# Patient Record
Sex: FEMALE | NOT HISPANIC OR LATINO
[De-identification: names, ages, dates, MRNs, and addresses within clinical notes are randomized per-mention and may not be internally consistent; named-entity substitution may affect disease eponyms.]

---

## 2018-05-02 PROBLEM — Z00.00 ENCOUNTER FOR PREVENTIVE HEALTH EXAMINATION: Status: ACTIVE | Noted: 2018-05-02

## 2018-05-14 ENCOUNTER — APPOINTMENT (OUTPATIENT)
Dept: BREAST CENTER | Facility: CLINIC | Age: 53
End: 2018-05-14
Payer: MEDICAID

## 2018-05-14 VITALS
DIASTOLIC BLOOD PRESSURE: 74 MMHG | WEIGHT: 142 LBS | SYSTOLIC BLOOD PRESSURE: 111 MMHG | BODY MASS INDEX: 22.82 KG/M2 | TEMPERATURE: 98.1 F | HEART RATE: 55 BPM | HEIGHT: 66 IN

## 2018-05-14 DIAGNOSIS — Z80.7 FAMILY HISTORY OF OTHER MALIGNANT NEOPLASMS OF LYMPHOID, HEMATOPOIETIC AND RELATED TISSUES: ICD-10-CM

## 2018-05-14 DIAGNOSIS — Z83.3 FAMILY HISTORY OF DIABETES MELLITUS: ICD-10-CM

## 2018-05-14 DIAGNOSIS — Z72.3 LACK OF PHYSICAL EXERCISE: ICD-10-CM

## 2018-05-14 PROCEDURE — 99204 OFFICE O/P NEW MOD 45 MIN: CPT

## 2018-05-14 RX ORDER — MULTIVITAMIN
TABLET ORAL
Refills: 0 | Status: ACTIVE | COMMUNITY

## 2018-11-12 ENCOUNTER — APPOINTMENT (OUTPATIENT)
Dept: BREAST CENTER | Facility: CLINIC | Age: 53
End: 2018-11-12

## 2019-08-22 PROBLEM — C50.412 MALIGNANT NEOPLASM OF UPPER-OUTER QUADRANT OF LEFT FEMALE BREAST, UNSPECIFIED ESTROGEN RECEPTOR STATUS: Status: ACTIVE | Noted: 2018-05-14

## 2019-08-23 ENCOUNTER — APPOINTMENT (OUTPATIENT)
Dept: BREAST CENTER | Facility: CLINIC | Age: 54
End: 2019-08-23
Payer: COMMERCIAL

## 2019-08-23 VITALS — HEART RATE: 67 BPM | SYSTOLIC BLOOD PRESSURE: 111 MMHG | DIASTOLIC BLOOD PRESSURE: 78 MMHG | TEMPERATURE: 98.1 F

## 2019-08-23 VITALS — DIASTOLIC BLOOD PRESSURE: 78 MMHG | SYSTOLIC BLOOD PRESSURE: 117 MMHG

## 2019-08-23 VITALS — TEMPERATURE: 98.1 F

## 2019-08-23 DIAGNOSIS — Z87.891 PERSONAL HISTORY OF NICOTINE DEPENDENCE: ICD-10-CM

## 2019-08-23 DIAGNOSIS — Z15.01 GENETIC SUSCEPTIBILITY TO MALIGNANT NEOPLASM OF BREAST: ICD-10-CM

## 2019-08-23 DIAGNOSIS — Z15.09 GENETIC SUSCEPTIBILITY TO MALIGNANT NEOPLASM OF BREAST: ICD-10-CM

## 2019-08-23 DIAGNOSIS — C50.412 MALIGNANT NEOPLASM OF UPPER-OUTER QUADRANT OF LEFT FEMALE BREAST: ICD-10-CM

## 2019-08-23 DIAGNOSIS — Z80.3 FAMILY HISTORY OF MALIGNANT NEOPLASM OF BREAST: ICD-10-CM

## 2019-08-23 DIAGNOSIS — Z80.7 FAMILY HISTORY OF OTHER MALIGNANT NEOPLASMS OF LYMPHOID, HEMATOPOIETIC AND RELATED TISSUES: ICD-10-CM

## 2019-08-23 PROCEDURE — 99214 OFFICE O/P EST MOD 30 MIN: CPT

## 2019-08-23 RX ORDER — DULOXETINE HYDROCHLORIDE 30 MG/1
30 CAPSULE, DELAYED RELEASE PELLETS ORAL
Refills: 0 | Status: ACTIVE | COMMUNITY

## 2019-08-26 ENCOUNTER — TRANSCRIPTION ENCOUNTER (OUTPATIENT)
Age: 54
End: 2019-08-26

## 2019-09-06 ENCOUNTER — MOBILE ON CALL (OUTPATIENT)
Age: 54
End: 2019-09-06

## 2019-09-17 NOTE — PAST MEDICAL HISTORY
[Postmenopausal] : The patient is postmenopausal [Menarche Age ____] : age at menarche was [unfilled] [Menopause Age____] : age at menopause was [unfilled] [Total Preg ___] : G[unfilled] [Live Births ___] : P[unfilled]  [Age At Live Birth ___] : Age at live birth: [unfilled] [Living ___] : Living: [unfilled] [FreeTextEntry5] : Jasvir oophorectomy [FreeTextEntry6] : none [FreeTextEntry8] : none [FreeTextEntry7] : none

## 2019-09-17 NOTE — ASSESSMENT
[FreeTextEntry1] : 53 y/o BRCA 1 positive [place] female, former patient of [facility], here for follow up visit. Pt with hx of bilat mastectomies 2014, Right prophylactic and left mastectomy (no resideual invasive carcinoma after neoadj and SLNbx 0/4 with implants, per Dr. Wu.  Medical oncologist is Dr. Omar Shook. No report of pt's cancer stage prior to neoadjuvant chemotherapy.\par Pt wondering if her left implant is riding higher. Denies any discrete masses. \par CBE: No suspicious masses but left implant is slightly high than right. Maybe capsule formation but rec MRI to rule out any underlying issues.\par Pt also wonder what kind of implant was used and rec that she contact Dr. Wu. 
Basal cell carcinoma of forehead  02/26/2018    Active  Claire García  Carpal tunnel syndrome of left wrist  02/26/2018    Active  Claire García

## 2019-09-17 NOTE — ADDENDUM
[FreeTextEntry1] : 9/12/19 [facility] MRI: compared to priors 2016 and CT abd 2016, There is intra and extracapsular rupture of R implant with free silicone noted medially, superiorly and laterally; L implant intact, few mildly prominent internal mammary LN R>L possibly exacerbated by the rupture, w/o sig lymphadenopathy, few cyst lesions noted to liver previously characterized on CT 2016. No susp enhancing lesions identified. BR2.

## 2019-09-17 NOTE — PHYSICAL EXAM
[Normocephalic] : normocephalic [Atraumatic] : atraumatic [Supple] : supple [No Supraclavicular Adenopathy] : no supraclavicular adenopathy [No Thyromegaly] : no thyromegaly [Examined in the supine and seated position] : examined in the supine and seated position [No dominant masses] : no dominant masses in right breast  [No dominant masses] : no dominant masses left breast [No Nipple Retraction] : no left nipple retraction [No Nipple Discharge] : no left nipple discharge [No Axillary Lymphadenopathy] : no left axillary lymphadenopathy [No Edema] : no edema [No Swelling] : no swelling [Full ROM] : full range of motion [No Rashes] : no rashes [No Ulceration] : no ulceration [de-identified] : s/p NSM with implant [de-identified] : left breast implant is slightly higher than right. No obvious suspicious lesions or palpable masses. No adenopathy.\par ? early capsule formation.

## 2019-09-17 NOTE — HISTORY OF PRESENT ILLNESS
[FreeTextEntry1] : 51 y/o BRCA 1 positive Persian female, former patient of [facility], here for follow up visit. Pt with hx of bilat mastectomies 2014, Right prophylactic and left mastectomy (no resideual invasive carcinoma after neoadj and SLNbx 0/4 with implants, per Dr. Wu.  Medical oncologist is Dr. Omar Shook. No report of pt's cancer stage prior to neoadjuvant chemotherapy.\par Pt wondering if her left implant is riding higher. Denies any discrete masses. \par   \par

## 2019-10-08 ENCOUNTER — MESSAGE (OUTPATIENT)
Age: 54
End: 2019-10-08

## 2020-04-02 PROBLEM — Z15.01 GENETIC PREDISPOSITION TO BREAST CANCER: Status: ACTIVE | Noted: 2018-05-14
